# Patient Record
Sex: MALE | Race: WHITE | NOT HISPANIC OR LATINO | Employment: FULL TIME | URBAN - NONMETROPOLITAN AREA
[De-identification: names, ages, dates, MRNs, and addresses within clinical notes are randomized per-mention and may not be internally consistent; named-entity substitution may affect disease eponyms.]

---

## 2024-01-15 PROBLEM — M48.061 SPINAL STENOSIS OF LUMBAR REGION: Chronic | Status: ACTIVE | Noted: 2024-01-15

## 2024-01-15 NOTE — PROGRESS NOTES
"  Subjective        Juanjose Key is a 23 y.o. male who presents for      HPI:          Chief Complaint   Patient presents with    Back Pain     Lumbar stenosis.  Dx approx 1 year ago    Immunizations     Received influenza vaccination this season       Pt is a NEW PATIENT to the office today      What concern/ problem/pain/symptom  brings you here today?  Spinal stenosis      When was pt diagnosis with lumbar stenosis?  Approx 1 year was the formal dx but he feels it's been an issue with him for years.  Played sports in school.      Pain lower back      Pt went to the ED- 1/2-23 - in [facility]         Did he have any injuries to his back?  None    Were any injuries at work?  No          Who is Ortho spine specialist?  Not yet.      Does pt also see Pain medicine clinic?  No      Does pt attend PT?  Yes, 01/2023      Better with flexion , worse with extension, will hurt at night    Pain will wake pt at night- rarely- and very mild and can fall back asleep     Pain 2/10 most of the time       Has pt had any medical or surgical procedures on his back ?  None      BP- 148/85      Works 3rd shift- had caffeine                  Social History     Tobacco Use   Smoking Status Never   Smokeless Tobacco Never         Review of Systems:        Objective        Vitals:    01/17/24 0923   BP: 148/85   BP Location: Left arm   Patient Position: Sitting   BP Cuff Size: Large adult   Pulse: 71   Temp: 37.2 °C (99 °F)   TempSrc: Temporal   SpO2: 98%   Weight: 109 kg (240 lb 4.8 oz)   Height: 1.88 m (6' 2\")             Patient is alert and oriented x 3 , NAD  HEAD- normocephalic and atraumatic   EYES-conjunctiva-normal, lids - normal  EARS/NOSE- normal external exam   NECK-supple,FROM  CV- RRR without murmur  PULM- CTA bilaterally, normal respiratory effort  RESPIRATORY EFFORT- normal , no retractions or nasal flaring   ABD- normoactive BS's   EXT- no edema,NT  SKIN- no abnormal skin lesions noted  NEURO- no focal " deficits  PSYCH- pleasant, normal judgement and insight  BACK-   lumbar vertebrae-NT throughout  extension- painful with extreme movement  flexion- minimal discomfort  DTR's BLE-normal 2+ and symmetrical  straight leg raise- negative  lumbar paraspinal muscles-  tight spasm on the            BP Readings from Last 3 Encounters:   01/17/24 148/85           Wt Readings from Last 3 Encounters:   01/17/24 109 kg (240 lb 4.8 oz)         BMI Readings from Last 3 Encounters:   01/17/24 30.85 kg/m²         Assessment/Plan      1. Spinal stenosis of lumbar region, unspecified whether neurogenic claudication present  Referral to Medical Spine      2. Elevated BP without diagnosis of hypertension  Follow Up In [facility] - PCP - Established          Orders Placed This Encounter   Procedures    Referral to Medical Spine     Refer Ortho Spine         Follow up 4-6 weeks - recheck BP - no current Medication     Call if no better or if symptoms worsen

## 2024-01-17 ENCOUNTER — OFFICE VISIT (OUTPATIENT)
Dept: PRIMARY CARE | Facility: CLINIC | Age: 24
End: 2024-01-17
Payer: COMMERCIAL

## 2024-01-17 VITALS
TEMPERATURE: 99 F | OXYGEN SATURATION: 98 % | WEIGHT: 240.3 LBS | BODY MASS INDEX: 30.84 KG/M2 | SYSTOLIC BLOOD PRESSURE: 148 MMHG | HEIGHT: 74 IN | HEART RATE: 71 BPM | DIASTOLIC BLOOD PRESSURE: 85 MMHG

## 2024-01-17 DIAGNOSIS — M48.061 SPINAL STENOSIS OF LUMBAR REGION, UNSPECIFIED WHETHER NEUROGENIC CLAUDICATION PRESENT: Primary | Chronic | ICD-10-CM

## 2024-01-17 DIAGNOSIS — R03.0 ELEVATED BP WITHOUT DIAGNOSIS OF HYPERTENSION: ICD-10-CM

## 2024-01-17 PROCEDURE — 1036F TOBACCO NON-USER: CPT | Performed by: FAMILY MEDICINE

## 2024-01-17 PROCEDURE — 99203 OFFICE O/P NEW LOW 30 MIN: CPT | Performed by: FAMILY MEDICINE

## 2024-01-17 RX ORDER — CYCLOBENZAPRINE HCL 10 MG
10 TABLET ORAL 3 TIMES DAILY PRN
COMMUNITY
End: 2024-03-18 | Stop reason: WASHOUT

## 2024-01-31 ENCOUNTER — APPOINTMENT (OUTPATIENT)
Dept: ORTHOPEDIC SURGERY | Facility: CLINIC | Age: 24
End: 2024-01-31
Payer: COMMERCIAL

## 2024-02-12 NOTE — PROGRESS NOTES
Subjective        Juanjose Key is a 23 y.o. male who presents for      HPI:          Chief Complaint   Patient presents with    Back Pain     Has dx of spinal stenosis and radiculopathy [sic]    Hypertension           Pt is here today to recheck BP    BP was 148/85 1/17/24        Today  /92           Has pt seen the  Ortho spine specialist? 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-        Discussed salt and caffeine             Works out at gym 5 days per week             Social History     Tobacco Use   Smoking Status Never   Smokeless Tobacco Never         Review of Systems:        Objective        Vitals:    02/20/24 0848   BP: (!) 141/92   BP Location: Left arm   Patient Position: Sitting   BP Cuff Size: Large adult   Pulse: 70   Temp: 37 °C (98.6 °F)   TempSrc: Temporal   SpO2: 97%   Weight: 106 kg (234 lb 3.2 oz)               Patient is alert and oriented x 3 , NAD  HEAD- normocephalic and atraumatic   EYES-conjunctiva-normal, lids - normal  EARS/NOSE- normal external exam   NECK-supple,FROM, thyroid - normal   CV- RRR without murmur  PULM- CTA bilaterally, normal respiratory effort  RESPIRATORY EFFORT- normal , no retractions or nasal flaring   ABD- normoactive BS's   EXT- no edema,NT  SKIN- no abnormal skin lesions noted  NEURO- no focal deficits  PSYCH- pleasant, normal judgement and insight      BP Readings from Last 3 Encounters:   02/20/24 (!) 141/92   01/17/24 148/85           Wt Readings from Last 3 Encounters:   02/20/24 106 kg (234 lb 3.2 oz)   01/17/24 109 kg (240 lb 4.8 oz)         BMI Readings from Last 3 Encounters:   02/20/24 30.07 kg/m²   01/17/24 30.85 kg/m²         Assessment/Plan      1. Elevated BP without diagnosis of hypertension  Follow Up In [facility] - PCP - Established      2. Chronic back pain, unspecified back location, unspecified back pain laterality            Ordered labs        Refer Ortho - spine      History vit d deficiency          Taking vit d OTC- 5000 IU daily- most days         Avoid caffeine         Avoid salt         DASH diet information         Increase activity              No orders of the defined types were placed in this encounter.            Follow up in 4-6 weeks - recheck BP

## 2024-02-16 PROBLEM — M54.50 LOW BACK PAIN, UNSPECIFIED: Status: ACTIVE | Noted: 2023-02-11

## 2024-02-16 PROBLEM — M53.3 DISORDER OF SACROILIAC JOINT: Status: ACTIVE | Noted: 2023-03-09

## 2024-02-16 PROBLEM — G89.29 CHRONIC PAIN: Status: ACTIVE | Noted: 2023-03-09

## 2024-02-20 ENCOUNTER — LAB (OUTPATIENT)
Dept: LAB | Facility: LAB | Age: 24
End: 2024-02-20
Payer: COMMERCIAL

## 2024-02-20 ENCOUNTER — OFFICE VISIT (OUTPATIENT)
Dept: PRIMARY CARE | Facility: CLINIC | Age: 24
End: 2024-02-20
Payer: COMMERCIAL

## 2024-02-20 VITALS
SYSTOLIC BLOOD PRESSURE: 141 MMHG | BODY MASS INDEX: 30.07 KG/M2 | WEIGHT: 234.2 LBS | DIASTOLIC BLOOD PRESSURE: 92 MMHG | TEMPERATURE: 98.6 F | HEART RATE: 70 BPM | OXYGEN SATURATION: 97 %

## 2024-02-20 DIAGNOSIS — R03.0 ELEVATED BP WITHOUT DIAGNOSIS OF HYPERTENSION: ICD-10-CM

## 2024-02-20 DIAGNOSIS — M54.9 CHRONIC BACK PAIN, UNSPECIFIED BACK LOCATION, UNSPECIFIED BACK PAIN LATERALITY: ICD-10-CM

## 2024-02-20 DIAGNOSIS — G89.29 CHRONIC BACK PAIN, UNSPECIFIED BACK LOCATION, UNSPECIFIED BACK PAIN LATERALITY: ICD-10-CM

## 2024-02-20 DIAGNOSIS — E55.9 VITAMIN D DEFICIENCY: ICD-10-CM

## 2024-02-20 DIAGNOSIS — R03.0 ELEVATED BP WITHOUT DIAGNOSIS OF HYPERTENSION: Primary | ICD-10-CM

## 2024-02-20 PROCEDURE — 82248 BILIRUBIN DIRECT: CPT

## 2024-02-20 PROCEDURE — 85027 COMPLETE CBC AUTOMATED: CPT

## 2024-02-20 PROCEDURE — 36415 COLL VENOUS BLD VENIPUNCTURE: CPT

## 2024-02-20 PROCEDURE — 82306 VITAMIN D 25 HYDROXY: CPT

## 2024-02-20 PROCEDURE — 80053 COMPREHEN METABOLIC PANEL: CPT

## 2024-02-20 PROCEDURE — 1036F TOBACCO NON-USER: CPT | Performed by: FAMILY MEDICINE

## 2024-02-20 PROCEDURE — 84443 ASSAY THYROID STIM HORMONE: CPT

## 2024-02-20 PROCEDURE — 99213 OFFICE O/P EST LOW 20 MIN: CPT | Performed by: FAMILY MEDICINE

## 2024-02-21 PROBLEM — R79.89 ABNORMAL TSH: Chronic | Status: ACTIVE | Noted: 2024-02-21

## 2024-02-21 PROBLEM — M48.061 FORAMINAL STENOSIS OF LUMBAR REGION: Status: ACTIVE | Noted: 2023-02-11

## 2024-02-21 PROBLEM — R79.89 ABNORMAL TSH: Status: ACTIVE | Noted: 2024-02-21

## 2024-02-21 PROBLEM — E55.9 VITAMIN D DEFICIENCY: Status: ACTIVE | Noted: 2024-02-21

## 2024-02-21 PROBLEM — R03.0 ELEVATED BLOOD PRESSURE READING WITHOUT DIAGNOSIS OF HYPERTENSION: Status: ACTIVE | Noted: 2024-02-21

## 2024-02-21 LAB
25(OH)D3 SERPL-MCNC: 31 NG/ML (ref 30–100)
ALBUMIN SERPL BCP-MCNC: 5 G/DL (ref 3.4–5)
ALP SERPL-CCNC: 64 U/L (ref 33–120)
ALT SERPL W P-5'-P-CCNC: 28 U/L (ref 10–52)
ANION GAP SERPL CALC-SCNC: 17 MMOL/L (ref 10–20)
AST SERPL W P-5'-P-CCNC: 20 U/L (ref 9–39)
BILIRUB DIRECT SERPL-MCNC: 0.1 MG/DL (ref 0–0.3)
BILIRUB SERPL-MCNC: 0.5 MG/DL (ref 0–1.2)
BUN SERPL-MCNC: 15 MG/DL (ref 6–23)
CALCIUM SERPL-MCNC: 10 MG/DL (ref 8.6–10.6)
CHLORIDE SERPL-SCNC: 103 MMOL/L (ref 98–107)
CO2 SERPL-SCNC: 25 MMOL/L (ref 21–32)
CREAT SERPL-MCNC: 0.89 MG/DL (ref 0.5–1.3)
EGFRCR SERPLBLD CKD-EPI 2021: >90 ML/MIN/1.73M*2
ERYTHROCYTE [DISTWIDTH] IN BLOOD BY AUTOMATED COUNT: 12.1 % (ref 11.5–14.5)
GLUCOSE SERPL-MCNC: 76 MG/DL (ref 74–99)
HCT VFR BLD AUTO: 45.6 % (ref 41–52)
HGB BLD-MCNC: 15.2 G/DL (ref 13.5–17.5)
MCH RBC QN AUTO: 28.7 PG (ref 26–34)
MCHC RBC AUTO-ENTMCNC: 33.3 G/DL (ref 32–36)
MCV RBC AUTO: 86 FL (ref 80–100)
NRBC BLD-RTO: 0 /100 WBCS (ref 0–0)
PLATELET # BLD AUTO: 368 X10*3/UL (ref 150–450)
POTASSIUM SERPL-SCNC: 4.5 MMOL/L (ref 3.5–5.3)
PROT SERPL-MCNC: 7.3 G/DL (ref 6.4–8.2)
RBC # BLD AUTO: 5.3 X10*6/UL (ref 4.5–5.9)
SODIUM SERPL-SCNC: 140 MMOL/L (ref 136–145)
TSH SERPL-ACNC: 4.28 MIU/L (ref 0.44–3.98)
WBC # BLD AUTO: 6.8 X10*3/UL (ref 4.4–11.3)

## 2024-03-04 ENCOUNTER — APPOINTMENT (OUTPATIENT)
Dept: ORTHOPEDIC SURGERY | Facility: CLINIC | Age: 24
End: 2024-03-04
Payer: COMMERCIAL

## 2024-03-07 PROBLEM — E66.811 CLASS 1 OBESITY DUE TO EXCESS CALORIES WITH BODY MASS INDEX (BMI) OF 30.0 TO 30.9 IN ADULT: Chronic | Status: ACTIVE | Noted: 2024-03-07

## 2024-03-07 PROBLEM — E66.09 CLASS 1 OBESITY DUE TO EXCESS CALORIES WITH BODY MASS INDEX (BMI) OF 30.0 TO 30.9 IN ADULT: Chronic | Status: ACTIVE | Noted: 2024-03-07

## 2024-03-07 PROBLEM — R03.0 ELEVATED BP WITHOUT DIAGNOSIS OF HYPERTENSION: Chronic | Status: ACTIVE | Noted: 2024-03-07

## 2024-03-07 NOTE — PROGRESS NOTES
Subjective        Juanjose Key. Is 23 y.o.. male. Here for follow up office visit-       No chief complaint on file.      HPI:        Follow up for recheck BP , insomnia, elevated BMI    BP was 141/92, 148/85      Pt is doing well      Pt had labs 2/20/24      TSH mildly abnormal- plan to recheck today   Plan recheck vit d in 6 months       Lab on 02/20/2024   Component Date Value Ref Range Status    Glucose 02/20/2024 76  74 - 99 mg/dL Final    Sodium 02/20/2024 140  136 - 145 mmol/L Final    Potassium 02/20/2024 4.5  3.5 - 5.3 mmol/L Final    Chloride 02/20/2024 103  98 - 107 mmol/L Final    Bicarbonate 02/20/2024 25  21 - 32 mmol/L Final    Anion Gap 02/20/2024 17  10 - 20 mmol/L Final    Urea Nitrogen 02/20/2024 15  6 - 23 mg/dL Final    Creatinine 02/20/2024 0.89  0.50 - 1.30 mg/dL Final    eGFR 02/20/2024 >90  >60 mL/min/1.73m*2 Final    Calculations of estimated GFR are performed using the 2021 CKD-EPI Study Refit equation without the race variable for the IDMS-Traceable creatinine methods.  https://jasn.asnjournals.org/content/early/2021/09/22/ASN.5984514870    Calcium 02/20/2024 10.0  8.6 - 10.6 mg/dL Final    Thyroid Stimulating Hormone 02/20/2024 4.28 (H)  0.44 - 3.98 mIU/L Final    Albumin 02/20/2024 5.0  3.4 - 5.0 g/dL Final    Bilirubin, Total 02/20/2024 0.5  0.0 - 1.2 mg/dL Final    Bilirubin, Direct 02/20/2024 0.1  0.0 - 0.3 mg/dL Final    Alkaline Phosphatase 02/20/2024 64  33 - 120 U/L Final    ALT 02/20/2024 28  10 - 52 U/L Final    Patients treated with Sulfasalazine may generate falsely decreased results for ALT.    AST 02/20/2024 20  9 - 39 U/L Final    Total Protein 02/20/2024 7.3  6.4 - 8.2 g/dL Final    WBC 02/20/2024 6.8  4.4 - 11.3 x10*3/uL Final    nRBC 02/20/2024 0.0  0.0 - 0.0 /100 WBCs Final    RBC 02/20/2024 5.30  4.50 - 5.90 x10*6/uL Final    Hemoglobin 02/20/2024 15.2  13.5 - 17.5 g/dL Final    Hematocrit 02/20/2024 45.6  41.0 - 52.0 % Final    MCV 02/20/2024 86  80 - 100 fL Final     MCH 02/20/2024 28.7  26.0 - 34.0 pg Final    MCHC 02/20/2024 33.3  32.0 - 36.0 g/dL Final    RDW 02/20/2024 12.1  11.5 - 14.5 % Final    Platelets 02/20/2024 368  150 - 450 x10*3/uL Final    Vitamin D, 25-Hydroxy, Total 02/20/2024 31  30 - 100 ng/mL Final         REVIEW OF SYSTEMS:        Objective      There were no vitals filed for this visit.        Patient is alert and oriented x 3 , NAD  HEAD- normocephalic and atraumatic   EYES-conjunctiva-normal, lids - normal  EARS/NOSE- normal external exam   NECK-supple,FROM  CV- RRR without murmur  PULM- CTA bilaterally, normal respiratory effort  RESPIRATORY EFFORT- normal , no retractions or nasal flaring   ABD- normoactive BS's   EXT- no edema,NT  SKIN- no abnormal skin lesions noted  NEURO- no focal deficits  PSYCH- pleasant, normal judgement and insight              PHYSICAL:      Patient is alert and oriented x 3 , NAD  HEAD- normocephalic and atraumatic   EYES-conjunctiva-normal, lids - normal  EARS/NOSE- normal external exam   NECK-supple,FROM  CV- RRR without murmur  PULM- CTA bilaterally, normal respiratory effort  RESPIRATORY EFFORT- normal , no retractions or nasal flaring   ABD- normoactive BS's   EXT- no edema,NT  SKIN- no abnormal skin lesions noted  NEURO- no focal deficits  PSYCH- pleasant, normal judgement and insight      BP Readings from Last 3 Encounters:   02/20/24 (!) 141/92   01/17/24 148/85             Wt Readings from Last 3 Encounters:   02/20/24 106 kg (234 lb 3.2 oz)   01/17/24 109 kg (240 lb 4.8 oz)           BMI Readings from Last 3 Encounters:   02/20/24 30.07 kg/m²   01/17/24 30.85 kg/m²               The number and complexity of problems addressed is considered moderate.  The amount and/or complexity of data reviewed and analyzed is considered moderate. The risk of complications and/or morbidity/mortality of patient is considered moderate. Overall, this patient encounter is considered a moderate risk visit.      Patient's BMI is  elevated.  Plan- diet and exercise- BMI is elevated. Need to increase activity on a daily basis especially walking.  Monitor  total calories per day- decrease carbohydrates and fats. Goal - lose 1-2 pounds per week.    Recommend 150 minutes of moderate-intensity exercise as tolerated per week and 2-3 days of resistance, flexibility, and neuromotor exercises per week.    Normal BMI- 18.5-25    Overweight=  BMI 26-29    Obese= BMI 30-39    Morbidly Obese = BMI >40        Assessment/Plan      Problem List Items Addressed This Visit          Active Problems    Abnormal TSH (Chronic)    Class 1 obesity due to excess calories with body mass index (BMI) of 30.0 to 30.9 in adult (Chronic)    Elevated BP without diagnosis of hypertension - Primary (Chronic)    Vitamin D deficiency       No orders of the defined types were placed in this encounter.                  Ordered lab      Follow up in

## 2024-03-18 ENCOUNTER — HOSPITAL ENCOUNTER (OUTPATIENT)
Dept: RADIOLOGY | Facility: CLINIC | Age: 24
Discharge: HOME | End: 2024-03-18
Payer: COMMERCIAL

## 2024-03-18 ENCOUNTER — OFFICE VISIT (OUTPATIENT)
Dept: ORTHOPEDIC SURGERY | Facility: CLINIC | Age: 24
End: 2024-03-18
Payer: COMMERCIAL

## 2024-03-18 ENCOUNTER — APPOINTMENT (OUTPATIENT)
Dept: ORTHOPEDIC SURGERY | Facility: CLINIC | Age: 24
End: 2024-03-18
Payer: COMMERCIAL

## 2024-03-18 DIAGNOSIS — G89.29 CHRONIC BACK PAIN, UNSPECIFIED BACK LOCATION, UNSPECIFIED BACK PAIN LATERALITY: ICD-10-CM

## 2024-03-18 DIAGNOSIS — M54.9 CHRONIC BACK PAIN, UNSPECIFIED BACK LOCATION, UNSPECIFIED BACK PAIN LATERALITY: ICD-10-CM

## 2024-03-18 DIAGNOSIS — M51.26 LUMBAR HERNIATED DISC: ICD-10-CM

## 2024-03-18 PROCEDURE — 72110 X-RAY EXAM L-2 SPINE 4/>VWS: CPT | Performed by: STUDENT IN AN ORGANIZED HEALTH CARE EDUCATION/TRAINING PROGRAM

## 2024-03-18 PROCEDURE — 72110 X-RAY EXAM L-2 SPINE 4/>VWS: CPT

## 2024-03-18 PROCEDURE — 99204 OFFICE O/P NEW MOD 45 MIN: CPT | Performed by: PHYSICAL MEDICINE & REHABILITATION

## 2024-03-18 PROCEDURE — 1036F TOBACCO NON-USER: CPT | Performed by: PHYSICAL MEDICINE & REHABILITATION

## 2024-03-18 ASSESSMENT — PAIN DESCRIPTION - DESCRIPTORS: DESCRIPTORS: SHARP;STABBING;SORE

## 2024-03-18 ASSESSMENT — PAIN SCALES - GENERAL
PAINLEVEL: 3
PAINLEVEL_OUTOF10: 3

## 2024-03-18 ASSESSMENT — PAIN - FUNCTIONAL ASSESSMENT: PAIN_FUNCTIONAL_ASSESSMENT: 0-10

## 2024-03-18 NOTE — PROGRESS NOTES
New Consult/New Patient Note    3/18/2024   Referred by Neva Green MD      Assessment:  Juanjose Key is a very pleasant 23 year old male with a history (4 years) of chronic back pain that has worsened over the past 2 years.  Patient reports intermittent back pain without inciting cause.  Patient states that he has gone to the emergency room in the past when he was in college and had imaging that demonstrated spinal stenosis with radiculopathy.  He has gone to physical therapy a year ago (15 sessions), and continues his home exercises 4-5 times a week.  His physical exam was unremarkable with no paraspinal tenderness and full range of motion of his lumbar spine. Pain likely secondary to:   - Discogenic back pain vs Lumbar stenosis     PLAN:  1)  Imaging/Diagnostic Studies: Patient states that he has had lumbar CT and X-ray in the past that demonstrated spinal stenosis. Order placed for lumbar MRI and X-ray. Has completed PT 5/2023 and has continued regular HEP for core strengthening 5x/week  2)  Therapy/Rehabilitation: PT referral given for directional preference based back exercises   3)  Pharmacological Management: None at this time. Recommend that patient use OTC nsaids, 800 mg tid w food for a few days medications if pain occurs. Moic wo relief. Not interested in voltaren.  4)  Spine/Surgical Interventions: None at this time   5)  Alternative Treatments: referral to acupuncture   6)  Consultations:  None at this time  7)  Follow -up: 4-6 weeks or PRN if symptoms worsen/do not improve.   8)  Future treatment considerations: Review Lumbar MRI       The above clinical summary has been dictated with voice recognition software. It has not been proofread for grammatical errors, typographical mistakes, or other semantic inconsistencies.    Thank you for visiting our office today. It was our pleasure to take part in your healthcare.     Do not hesitate to call with any questions regarding your plan of care after  leaving at [phone]    To clinicians, thank you very much for this kind referral. It is a privilege to partner with you in the care of your patients. My office would be delighted to assist you with any further consultations or with questions regarding the plan of care outlined. Do not hesitate to call the office or contact me directly.     Sincerely,    Ananth Kelly DO  Physical Medicine and Rehabilitation PGY-4  [facility]     Supervisory note:  Seen with Dr Kelly, resident.  I saw and evaluated the patient. I personally obtained the key and critical portions of the history and physical exam. I reviewed the resident's documentation and discussed the patient with the resident. I agree with the resident's medical decision making as documented in the resident's note.       Ondina Zeng MD     Division of PM&R      Juanjose Key   is a 23 y.o. male who presents with back pain for the past 2 years.   Patient states that he had a lumbar x-ray in the past that demonstrated lumbar stenosis.   Patient states that he had 4 bulging discs in the past with associated radiculopathy      Location: Lower lumbar  Radiation: Radiates to both legs, typically anterior thigh to bilateral knee, he reports soreness in his proximal legs with this  Quality: Stabbing , tingling, numb current 0/10,  at its worst  8-10/10  Exacerbated by bending over quickly  Relieved by laying down, stretching out, bending forward  Onset, traumatic event: Chronic but worse over the past 2 years  Has tried:  muscle relaxants (flexeril, tizanidine, robaxin)      Valsalva sign is negative  Grocery cart sign is negative  Smoker:  Denies  Does not wake them at night  Litigation: None    Patient denies bowel/bladder incontinence, denies fever, denies unintentional weight loss, denies clumsiness of hands, feet, or dropping things.  Denies any constitutional or myelopathic symptomatology.      PREVIOUS  TREATMENTS  IN THE LAST SIX MONTHS     Active conservative therapy  in the last six months (see below)  1. Physical therapy: 1 year ago- core strengthening/stretching in [facility] (15 sessions), completed April 2023  2. Home exercise program after PT: Yes, 4-5 days a week   3. A physician supervised home exercise program (HEP): No  4. Chiropractic Care: None                                                                    Passive conservative therapy  in the last six months (see below)  1. NSAIDS:   2. Prescription pain medication: Meloxicam (1.5 years ago), muscle relaxants    3. Acupuncture: None  4. Tens unit: Yes- no improvement     Assistive Devices: None    Work status:  at [employer]      ROS: Other than listed in HPI, PMHX below, and intake paperwork including a 30 point patient-recorded review of symptoms which was personally reviewed and inclusive of no history of unintentional weight loss, change in appetite, significant malaise, fevers, chills, or change in bowel/bladder, shortness of breath, or chest pain.    I have confirmed and edited as necessary Past Medical, Past Surgical, Family, Social History and ROS as obtained by others. These were also obtained on new patient forms.      PHYSICAL EXAM:   GENERAL APPEARANCE:  Well nourished, well developed, and no apparent distress.  NEURO PSYCH: Patient oriented to person, place, Mood pleasant. Benign affect.  MUSCULOSKELETAL and NEUROLOGICAL       VISUAL INSPECTION          CERVICAL: WNL          THORACIC: WNL           LUMBAR: WNL  SPINE ROM:   LUMBAR ROM: Normal      PALPATION:           SPINOUS PROCESS: Non-tender to palpation           PARASPINALS: Non-tender to palpation  FACET LOADING: Negative  MUSCLE BULK: Normal and symmetrical in the upper & lower extremities.  MUSCLE TONE: Normal  MOTOR: 5/5 in all muscle groups tested in bilateral upper and lower extremities   SENSORY: Normal sensory exam to light touch  GAIT: Normal.  Able to go up  and heels and toes with no sig. weakness.  No sig. balance deficit appreciated  REFLEXES: +2 to bilateral U/L extremities  LONG TRACT SIGNS: No clonus, Neg Hoffmans.  PERIPHERAL JOINT ROM:   HIP ROM: Full bilaterally  SARAVANAN/Thigh Thrust/Compression/Mike Finger:  Negative bilaterally   Hip Exam including thigh thrust and LOG ROLL: Negative bilaterally    DATA REVIEW:   The below imaging studies were personally reviewed and discussed with the patient.    Medical Decision Making:  The above note constitutes a Moderate to High level of medical decision making based on past data and imaging review, new and chronic symptoms with exacerbation, change in weakness or sensation, new imaging and diagnostic studies ordered, discussion of potential interventional or surgical treatment options, acute or chronic pain that may pose a threat to bodily function.    Past Medical History:   Diagnosis Date    Other infective otitis externa, unspecified ear 11/19/2015    Infective otitis externa    Personal history of other diseases of the nervous system and sense organs 05/29/2015    History of acute otitis media       Medication Documentation Review Audit       Reviewed by Kayli Vu LPN (Licensed Nurse) on 03/18/24 at 1319      Medication Order Taking? Sig Documenting Provider Last Dose Status   cyclobenzaprine (Flexeril) 10 mg tablet [id number] No Take 1 tablet (10 mg) by mouth 3 times a day as needed for muscle spasms. 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   Tobacco Use    Smoking status: Never    Smokeless tobacco: Never   Substance and Sexual Activity    Alcohol  use: Yes     Comment: 2-3 weekly    Drug use: Never    Sexual activity: Not on file   Other Topics Concern    Not on file   Social History Narrative    Not on file     Social Determinants of Health     Financial Resource Strain: Not on file   Food Insecurity: Not on file   Transportation Needs: Not on file   Physical Activity: Not on file   Stress: Not on file   Social Connections: Not on file   Intimate Partner Violence: Not on file   Housing Stability: Not on file       No past surgical history on file.

## 2024-03-20 ENCOUNTER — APPOINTMENT (OUTPATIENT)
Dept: PRIMARY CARE | Facility: CLINIC | Age: 24
End: 2024-03-20
Payer: COMMERCIAL

## 2024-04-01 NOTE — PROGRESS NOTES
Subjective        Juanjose Key. Is 23 y.o.. male. Here for follow up office visit-       No chief complaint on file.      HPI:        Follow up for recheck BP , insomnia, elevated BMI    BP was 141/92, 148/85      Pt is doing well      Pt had labs 2/20/24      TSH mildly abnormal- plan to recheck today   Plan recheck vit d in 6 months       Lab on 02/20/2024   Component Date Value Ref Range Status    Glucose 02/20/2024 76  74 - 99 mg/dL Final    Sodium 02/20/2024 140  136 - 145 mmol/L Final    Potassium 02/20/2024 4.5  3.5 - 5.3 mmol/L Final    Chloride 02/20/2024 103  98 - 107 mmol/L Final    Bicarbonate 02/20/2024 25  21 - 32 mmol/L Final    Anion Gap 02/20/2024 17  10 - 20 mmol/L Final    Urea Nitrogen 02/20/2024 15  6 - 23 mg/dL Final    Creatinine 02/20/2024 0.89  0.50 - 1.30 mg/dL Final    eGFR 02/20/2024 >90  >60 mL/min/1.73m*2 Final    Calculations of estimated GFR are performed using the 2021 CKD-EPI Study Refit equation without the race variable for the IDMS-Traceable creatinine methods.  https://jasn.asnjournals.org/content/early/2021/09/22/ASN.8397599511    Calcium 02/20/2024 10.0  8.6 - 10.6 mg/dL Final    Thyroid Stimulating Hormone 02/20/2024 4.28 (H)  0.44 - 3.98 mIU/L Final    Albumin 02/20/2024 5.0  3.4 - 5.0 g/dL Final    Bilirubin, Total 02/20/2024 0.5  0.0 - 1.2 mg/dL Final    Bilirubin, Direct 02/20/2024 0.1  0.0 - 0.3 mg/dL Final    Alkaline Phosphatase 02/20/2024 64  33 - 120 U/L Final    ALT 02/20/2024 28  10 - 52 U/L Final    Patients treated with Sulfasalazine may generate falsely decreased results for ALT.    AST 02/20/2024 20  9 - 39 U/L Final    Total Protein 02/20/2024 7.3  6.4 - 8.2 g/dL Final    WBC 02/20/2024 6.8  4.4 - 11.3 x10*3/uL Final    nRBC 02/20/2024 0.0  0.0 - 0.0 /100 WBCs Final    RBC 02/20/2024 5.30  4.50 - 5.90 x10*6/uL Final    Hemoglobin 02/20/2024 15.2  13.5 - 17.5 g/dL Final    Hematocrit 02/20/2024 45.6  41.0 - 52.0 % Final    MCV 02/20/2024 86  80 - 100 fL Final     MCH 02/20/2024 28.7  26.0 - 34.0 pg Final    MCHC 02/20/2024 33.3  32.0 - 36.0 g/dL Final    RDW 02/20/2024 12.1  11.5 - 14.5 % Final    Platelets 02/20/2024 368  150 - 450 x10*3/uL Final    Vitamin D, 25-Hydroxy, Total 02/20/2024 31  30 - 100 ng/mL Final         REVIEW OF SYSTEMS:        Objective      There were no vitals filed for this visit.        Patient is alert and oriented x 3 , NAD  HEAD- normocephalic and atraumatic   EYES-conjunctiva-normal, lids - normal  EARS/NOSE- normal external exam   NECK-supple,FROM  CV- RRR without murmur  PULM- CTA bilaterally, normal respiratory effort  RESPIRATORY EFFORT- normal , no retractions or nasal flaring   ABD- normoactive BS's   EXT- no edema,NT  SKIN- no abnormal skin lesions noted  NEURO- no focal deficits  PSYCH- pleasant, normal judgement and insight              PHYSICAL:      Patient is alert and oriented x 3 , NAD  HEAD- normocephalic and atraumatic   EYES-conjunctiva-normal, lids - normal  EARS/NOSE- normal external exam   NECK-supple,FROM  CV- RRR without murmur  PULM- CTA bilaterally, normal respiratory effort  RESPIRATORY EFFORT- normal , no retractions or nasal flaring   ABD- normoactive BS's   EXT- no edema,NT  SKIN- no abnormal skin lesions noted  NEURO- no focal deficits  PSYCH- pleasant, normal judgement and insight      BP Readings from Last 3 Encounters:   02/20/24 (!) 141/92   01/17/24 148/85             Wt Readings from Last 3 Encounters:   02/20/24 106 kg (234 lb 3.2 oz)   01/17/24 109 kg (240 lb 4.8 oz)           BMI Readings from Last 3 Encounters:   02/20/24 30.07 kg/m²   01/17/24 30.85 kg/m²               The number and complexity of problems addressed is considered moderate.  The amount and/or complexity of data reviewed and analyzed is considered moderate. The risk of complications and/or morbidity/mortality of patient is considered moderate. Overall, this patient encounter is considered a moderate risk visit.      Patient's BMI is  elevated.  Plan- diet and exercise- BMI is elevated. Need to increase activity on a daily basis especially walking.  Monitor  total calories per day- decrease carbohydrates and fats. Goal - lose 1-2 pounds per week.    Recommend 150 minutes of moderate-intensity exercise as tolerated per week and 2-3 days of resistance, flexibility, and neuromotor exercises per week.    Normal BMI- 18.5-25    Overweight=  BMI 26-29    Obese= BMI 30-39    Morbidly Obese = BMI >40        Assessment/Plan      Problem List Items Addressed This Visit    None        No orders of the defined types were placed in this encounter.                  Ordered lab      Follow up in

## 2024-04-03 ENCOUNTER — APPOINTMENT (OUTPATIENT)
Dept: PRIMARY CARE | Facility: CLINIC | Age: 24
End: 2024-04-03
Payer: COMMERCIAL

## 2024-04-08 ENCOUNTER — APPOINTMENT (OUTPATIENT)
Dept: RADIOLOGY | Facility: HOSPITAL | Age: 24
End: 2024-04-08
Payer: COMMERCIAL

## 2024-04-09 ENCOUNTER — APPOINTMENT (OUTPATIENT)
Dept: RADIOLOGY | Facility: HOSPITAL | Age: 24
End: 2024-04-09
Payer: COMMERCIAL

## 2024-04-11 ENCOUNTER — APPOINTMENT (OUTPATIENT)
Dept: INTEGRATIVE MEDICINE | Facility: CLINIC | Age: 24
End: 2024-04-11
Payer: COMMERCIAL

## 2024-04-11 ENCOUNTER — ALLIED HEALTH (OUTPATIENT)
Dept: INTEGRATIVE MEDICINE | Facility: CLINIC | Age: 24
End: 2024-04-11
Payer: COMMERCIAL

## 2024-04-11 DIAGNOSIS — G89.29 CHRONIC BILATERAL LOW BACK PAIN, UNSPECIFIED WHETHER SCIATICA PRESENT: Primary | ICD-10-CM

## 2024-04-11 DIAGNOSIS — M54.50 CHRONIC BILATERAL LOW BACK PAIN, UNSPECIFIED WHETHER SCIATICA PRESENT: Primary | ICD-10-CM

## 2024-04-11 PROCEDURE — 97810 ACUP 1/> WO ESTIM 1ST 15 MIN: CPT

## 2024-04-11 PROCEDURE — 97811 ACUP 1/> W/O ESTIM EA ADD 15: CPT

## 2024-04-11 PROCEDURE — 99202 OFFICE O/P NEW SF 15 MIN: CPT

## 2024-04-11 SDOH — ECONOMIC STABILITY: FOOD INSECURITY: WITHIN THE PAST 12 MONTHS, THE FOOD YOU BOUGHT JUST DIDN'T LAST AND YOU DIDN'T HAVE MONEY TO GET MORE.: NEVER TRUE

## 2024-04-11 SDOH — SOCIAL STABILITY: SOCIAL NETWORK: I HAVE TROUBLE DOING ALL OF THE ACTIVITIES WITH FRIENDS THAT I WANT TO DO: SOMETIMES

## 2024-04-11 SDOH — ECONOMIC STABILITY: FOOD INSECURITY: WITHIN THE PAST 12 MONTHS, YOU WORRIED THAT YOUR FOOD WOULD RUN OUT BEFORE YOU GOT MONEY TO BUY MORE.: NEVER TRUE

## 2024-04-11 SDOH — SOCIAL STABILITY: SOCIAL NETWORK: I HAVE TROUBLE DOING ALL OF MY REGULAR LEISURE ACTIVITIES WITH OTHERS: SOMETIMES

## 2024-04-11 SDOH — SOCIAL STABILITY: SOCIAL NETWORK: I HAVE TROUBLE DOING ALL OF MY USUAL WORK (INCLUDE WORK AT HOME): RARELY

## 2024-04-11 SDOH — SOCIAL STABILITY: SOCIAL NETWORK: PROMIS ABILITY TO PARTICIPATE IN SOCIAL ROLES & ACTIVITIES T-SCORE: 48

## 2024-04-11 SDOH — SOCIAL STABILITY: SOCIAL NETWORK: I HAVE TROUBLE DOING ALL OF THE FAMILY ACTIVITIES THAT I WANT TO DO: RARELY

## 2024-04-11 SDOH — SOCIAL STABILITY: SOCIAL NETWORK

## 2024-04-11 ASSESSMENT — PROMIS GLOBAL HEALTH SCALE
RATE_QUALITY_OF_LIFE: GOOD
EMOTIONAL_PROBLEMS: NEVER
RATE_MENTAL_HEALTH: VERY GOOD
RATE_SOCIAL_SATISFACTION: GOOD
CARRYOUT_SOCIAL_ACTIVITIES: GOOD
CARRYOUT_PHYSICAL_ACTIVITIES: MOSTLY
RATE_AVERAGE_FATIGUE: MILD
RATE_AVERAGE_PAIN: 5
RATE_GENERAL_HEALTH: GOOD
RATE_PHYSICAL_HEALTH: GOOD

## 2024-04-11 ASSESSMENT — ANXIETY QUESTIONNAIRES
PAST MONTH HOW OFTEN HAVE YOU FELT CONFIDENT ABOUT YOUR ABILITY TO HANDLE YOUR PROBLEMS: 4 - VERY OFTEN
PAST MONTH HOW OFTEN HAVE YOU FELT THAT THINGS WERE GOING YOUR WAY: 3 - FAIRLY OFTEN
PAST MONTH HOW OFTEN HAVE YOU FELT DIFFICULTIES WERE PILING UP SO HIGH THAT YOU COULD NOT OVERCOME THEM: 0 - NEVER
PAST MONTH HOW OFTEN HAVE YOU FELT CONFIDENT ABOUT YOUR ABILITY TO HANDLE YOUR PROBLEMS: 4 - VERY OFTEN
PAST MONTH HOW OFTEN HAVE YOU FELT THAT YOU WERE UNABLE TO CONTROL THE IMPORTANT THINGS IN YOUR LIFE: 3 - FAIRLY OFTEN
PAST MONTH HOW OFTEN HAVE YOU FELT THAT YOU WERE UNABLE TO CONTROL THE IMPORTANT THINGS IN YOUR LIFE: 3 - FAIRLY OFTEN

## 2024-04-11 NOTE — PROGRESS NOTES
"Acupuncture Visit:     Subjective   Patient ID: Juanjose Key is a 23 y.o. male who presents for Back Pain     Employee Health  12 [employer]    CC: Chronic Low Back Pain    Initial Visit: 04/11/24  Tx 1/20    Patient is a 23 year old male seeking support for chronic low back pain.  He states he plans on getting an MRI to assess low back more extensively.  He notes back pain has gotten worse with time.      Onset:  4 years ongoing but notes it has been longer than that    Location:  Fixed in the lumbar region mostly but can radiate out into the hips and onto the lower limbs    Duration:  Varied    Frequency:  Daily but notes to varying degrees, sometimes he is unaware of the pain    Characteristics:  Sharp stabbing  Dull achy    Alleviates:  Non weight bearing home     Aggravates:   Hyper extension of back    Related sx:  Not debilitating enough to cause a lot of disruption in daily life but notes he generally tries to anticipate and avoid activities that can cause more pain.    Tx methods:  Pt  Goes to the gym often  Rx muscle relaxers and OTC pain meds    Pain severity:  Worse - 10/10 but rare otherwise worse is 7/10  Normal - 3-4/10  Today - 3-4/10    Feeling (emotions or fatigue):  Tired    Other relevant:  Dx hip dysplasia at birth    Goal for acupuncture treatment:  \"Trying all my options to see if it feels better\"                            Session Information  Is this acupuncture treatment being billed to the patient's insurance company: Yes  This is visit number: 1  The patient has a total number of visits of: 20  Initial Acupuncture Treatment date: 04/11/24  Name of Insurance Company:  Employee  Visit Type: New patient  Medical History Reviewed: I have reviewed pertinent medical history in EHR, and no contraindications are present to provide treatment         Review of Systems         Provider reviewed plan for the acupuncture session, precautions and contraindications. Patient/guardian/hospital staff has " given consent to treat with full understanding of what to expect during the session. Before acupuncture began, provider explained to the patient to communicate at any time if the procedure was causing discomfort past their tolerance level. Patient agreed to advise acupuncturist. The acupuncturist counseled the patient on the risks of acupuncture treatment including pain, infection, bleeding, and no relief of pain. The patient was positioned comfortably. There was no evidence of infection at the site of needle insertions.    Objective   Physical Exam         Treatment Plan  Treatment Goals: Pain management, Wellbeing improvement    Acupuncture Treatment  Patient Position: Prone on a table  Acupuncture Needling: Yes  Needle Guage: 32 guage /.25/ Purple seirin  Body Points: With retention  Body Points - Bilateral: BL23,52,62; KD3,7; HTJJ L3-S1  Auricular Points: No  Electroacupuncture Used: No  Other Techniques Utilized: TDP Lamp, Topicals  Topicals Description: Carlos Lopez  TDP Lamp Descripton: 20mins on low back  Needle Count In: 18  Needle Count Out: 18  Needle Retention Time (min): 30 minutes  Total Face to Face Time (min): 30 minutes              Assessment/Plan   Diagnoses and all orders for this visit:  Chronic bilateral low back pain, unspecified whether sciatica present

## 2024-04-15 NOTE — PROGRESS NOTES
Subjective        Juanjose Key. Is 23 y.o.. male. Here for follow up office visit-       No chief complaint on file.      HPI:        Follow up for recheck BP , insomnia, elevated BMI    BP was 141/92, 148/85      Pt is doing well      Pt had labs 2/20/24      TSH mildly abnormal- plan to recheck today   Plan recheck vit d in 6 months       Lab on 02/20/2024   Component Date Value Ref Range Status    Glucose 02/20/2024 76  74 - 99 mg/dL Final    Sodium 02/20/2024 140  136 - 145 mmol/L Final    Potassium 02/20/2024 4.5  3.5 - 5.3 mmol/L Final    Chloride 02/20/2024 103  98 - 107 mmol/L Final    Bicarbonate 02/20/2024 25  21 - 32 mmol/L Final    Anion Gap 02/20/2024 17  10 - 20 mmol/L Final    Urea Nitrogen 02/20/2024 15  6 - 23 mg/dL Final    Creatinine 02/20/2024 0.89  0.50 - 1.30 mg/dL Final    eGFR 02/20/2024 >90  >60 mL/min/1.73m*2 Final    Calculations of estimated GFR are performed using the 2021 CKD-EPI Study Refit equation without the race variable for the IDMS-Traceable creatinine methods.  https://jasn.asnjournals.org/content/early/2021/09/22/ASN.6652645961    Calcium 02/20/2024 10.0  8.6 - 10.6 mg/dL Final    Thyroid Stimulating Hormone 02/20/2024 4.28 (H)  0.44 - 3.98 mIU/L Final    Albumin 02/20/2024 5.0  3.4 - 5.0 g/dL Final    Bilirubin, Total 02/20/2024 0.5  0.0 - 1.2 mg/dL Final    Bilirubin, Direct 02/20/2024 0.1  0.0 - 0.3 mg/dL Final    Alkaline Phosphatase 02/20/2024 64  33 - 120 U/L Final    ALT 02/20/2024 28  10 - 52 U/L Final    Patients treated with Sulfasalazine may generate falsely decreased results for ALT.    AST 02/20/2024 20  9 - 39 U/L Final    Total Protein 02/20/2024 7.3  6.4 - 8.2 g/dL Final    WBC 02/20/2024 6.8  4.4 - 11.3 x10*3/uL Final    nRBC 02/20/2024 0.0  0.0 - 0.0 /100 WBCs Final    RBC 02/20/2024 5.30  4.50 - 5.90 x10*6/uL Final    Hemoglobin 02/20/2024 15.2  13.5 - 17.5 g/dL Final    Hematocrit 02/20/2024 45.6  41.0 - 52.0 % Final    MCV 02/20/2024 86  80 - 100 fL Final     MCH 02/20/2024 28.7  26.0 - 34.0 pg Final    MCHC 02/20/2024 33.3  32.0 - 36.0 g/dL Final    RDW 02/20/2024 12.1  11.5 - 14.5 % Final    Platelets 02/20/2024 368  150 - 450 x10*3/uL Final    Vitamin D, 25-Hydroxy, Total 02/20/2024 31  30 - 100 ng/mL Final         REVIEW OF SYSTEMS:        Objective      There were no vitals filed for this visit.        Patient is alert and oriented x 3 , NAD  HEAD- normocephalic and atraumatic   EYES-conjunctiva-normal, lids - normal  EARS/NOSE- normal external exam   NECK-supple,FROM  CV- RRR without murmur  PULM- CTA bilaterally, normal respiratory effort  RESPIRATORY EFFORT- normal , no retractions or nasal flaring   ABD- normoactive BS's   EXT- no edema,NT  SKIN- no abnormal skin lesions noted  NEURO- no focal deficits  PSYCH- pleasant, normal judgement and insight              PHYSICAL:      Patient is alert and oriented x 3 , NAD  HEAD- normocephalic and atraumatic   EYES-conjunctiva-normal, lids - normal  EARS/NOSE- normal external exam   NECK-supple,FROM  CV- RRR without murmur  PULM- CTA bilaterally, normal respiratory effort  RESPIRATORY EFFORT- normal , no retractions or nasal flaring   ABD- normoactive BS's   EXT- no edema,NT  SKIN- no abnormal skin lesions noted  NEURO- no focal deficits  PSYCH- pleasant, normal judgement and insight      BP Readings from Last 3 Encounters:   02/20/24 (!) 141/92   01/17/24 148/85             Wt Readings from Last 3 Encounters:   02/20/24 106 kg (234 lb 3.2 oz)   01/17/24 109 kg (240 lb 4.8 oz)           BMI Readings from Last 3 Encounters:   02/20/24 30.07 kg/m²   01/17/24 30.85 kg/m²               The number and complexity of problems addressed is considered moderate.  The amount and/or complexity of data reviewed and analyzed is considered moderate. The risk of complications and/or morbidity/mortality of patient is considered moderate. Overall, this patient encounter is considered a moderate risk visit.      Patient's BMI is  elevated.  Plan- diet and exercise- BMI is elevated. Need to increase activity on a daily basis especially walking.  Monitor  total calories per day- decrease carbohydrates and fats. Goal - lose 1-2 pounds per week.    Recommend 150 minutes of moderate-intensity exercise as tolerated per week and 2-3 days of resistance, flexibility, and neuromotor exercises per week.    Normal BMI- 18.5-25    Overweight=  BMI 26-29    Obese= BMI 30-39    Morbidly Obese = BMI >40        Assessment/Plan      Problem List Items Addressed This Visit    None        No orders of the defined types were placed in this encounter.                  Ordered lab      Follow up in

## 2024-04-17 ENCOUNTER — APPOINTMENT (OUTPATIENT)
Dept: PRIMARY CARE | Facility: CLINIC | Age: 24
End: 2024-04-17
Payer: COMMERCIAL

## 2024-04-17 NOTE — PROGRESS NOTES
Subjective        Juanjose Key. Is 23 y.o.. male. Here for follow up office visit-       No chief complaint on file.      HPI:        Follow up for recheck BP , insomnia, elevated BMI    BP was 141/92, 148/85      Pt is doing well      Pt had labs 2/20/24      TSH mildly abnormal- plan to recheck today   Plan recheck vit d in 6 months       Lab on 02/20/2024   Component Date Value Ref Range Status    Glucose 02/20/2024 76  74 - 99 mg/dL Final    Sodium 02/20/2024 140  136 - 145 mmol/L Final    Potassium 02/20/2024 4.5  3.5 - 5.3 mmol/L Final    Chloride 02/20/2024 103  98 - 107 mmol/L Final    Bicarbonate 02/20/2024 25  21 - 32 mmol/L Final    Anion Gap 02/20/2024 17  10 - 20 mmol/L Final    Urea Nitrogen 02/20/2024 15  6 - 23 mg/dL Final    Creatinine 02/20/2024 0.89  0.50 - 1.30 mg/dL Final    eGFR 02/20/2024 >90  >60 mL/min/1.73m*2 Final    Calculations of estimated GFR are performed using the 2021 CKD-EPI Study Refit equation without the race variable for the IDMS-Traceable creatinine methods.  https://jasn.asnjournals.org/content/early/2021/09/22/ASN.5911873631    Calcium 02/20/2024 10.0  8.6 - 10.6 mg/dL Final    Thyroid Stimulating Hormone 02/20/2024 4.28 (H)  0.44 - 3.98 mIU/L Final    Albumin 02/20/2024 5.0  3.4 - 5.0 g/dL Final    Bilirubin, Total 02/20/2024 0.5  0.0 - 1.2 mg/dL Final    Bilirubin, Direct 02/20/2024 0.1  0.0 - 0.3 mg/dL Final    Alkaline Phosphatase 02/20/2024 64  33 - 120 U/L Final    ALT 02/20/2024 28  10 - 52 U/L Final    Patients treated with Sulfasalazine may generate falsely decreased results for ALT.    AST 02/20/2024 20  9 - 39 U/L Final    Total Protein 02/20/2024 7.3  6.4 - 8.2 g/dL Final    WBC 02/20/2024 6.8  4.4 - 11.3 x10*3/uL Final    nRBC 02/20/2024 0.0  0.0 - 0.0 /100 WBCs Final    RBC 02/20/2024 5.30  4.50 - 5.90 x10*6/uL Final    Hemoglobin 02/20/2024 15.2  13.5 - 17.5 g/dL Final    Hematocrit 02/20/2024 45.6  41.0 - 52.0 % Final    MCV 02/20/2024 86  80 - 100 fL Final     MCH 02/20/2024 28.7  26.0 - 34.0 pg Final    MCHC 02/20/2024 33.3  32.0 - 36.0 g/dL Final    RDW 02/20/2024 12.1  11.5 - 14.5 % Final    Platelets 02/20/2024 368  150 - 450 x10*3/uL Final    Vitamin D, 25-Hydroxy, Total 02/20/2024 31  30 - 100 ng/mL Final         REVIEW OF SYSTEMS:        Objective      There were no vitals filed for this visit.        Patient is alert and oriented x 3 , NAD  HEAD- normocephalic and atraumatic   EYES-conjunctiva-normal, lids - normal  EARS/NOSE- normal external exam   NECK-supple,FROM  CV- RRR without murmur  PULM- CTA bilaterally, normal respiratory effort  RESPIRATORY EFFORT- normal , no retractions or nasal flaring   ABD- normoactive BS's   EXT- no edema,NT  SKIN- no abnormal skin lesions noted  NEURO- no focal deficits  PSYCH- pleasant, normal judgement and insight              PHYSICAL:      Patient is alert and oriented x 3 , NAD  HEAD- normocephalic and atraumatic   EYES-conjunctiva-normal, lids - normal  EARS/NOSE- normal external exam   NECK-supple,FROM  CV- RRR without murmur  PULM- CTA bilaterally, normal respiratory effort  RESPIRATORY EFFORT- normal , no retractions or nasal flaring   ABD- normoactive BS's   EXT- no edema,NT  SKIN- no abnormal skin lesions noted  NEURO- no focal deficits  PSYCH- pleasant, normal judgement and insight      BP Readings from Last 3 Encounters:   02/20/24 (!) 141/92   01/17/24 148/85             Wt Readings from Last 3 Encounters:   02/20/24 106 kg (234 lb 3.2 oz)   01/17/24 109 kg (240 lb 4.8 oz)           BMI Readings from Last 3 Encounters:   02/20/24 30.07 kg/m²   01/17/24 30.85 kg/m²               The number and complexity of problems addressed is considered moderate.  The amount and/or complexity of data reviewed and analyzed is considered moderate. The risk of complications and/or morbidity/mortality of patient is considered moderate. Overall, this patient encounter is considered a moderate risk visit.      Patient's BMI is  elevated.  Plan- diet and exercise- BMI is elevated. Need to increase activity on a daily basis especially walking.  Monitor  total calories per day- decrease carbohydrates and fats. Goal - lose 1-2 pounds per week.    Recommend 150 minutes of moderate-intensity exercise as tolerated per week and 2-3 days of resistance, flexibility, and neuromotor exercises per week.    Normal BMI- 18.5-25    Overweight=  BMI 26-29    Obese= BMI 30-39    Morbidly Obese = BMI >40        Assessment/Plan      Problem List Items Addressed This Visit    None        No orders of the defined types were placed in this encounter.                  Ordered lab      Follow up in

## 2024-04-18 ENCOUNTER — APPOINTMENT (OUTPATIENT)
Dept: INTEGRATIVE MEDICINE | Facility: CLINIC | Age: 24
End: 2024-04-18
Payer: COMMERCIAL

## 2024-04-23 ENCOUNTER — APPOINTMENT (OUTPATIENT)
Dept: RADIOLOGY | Facility: HOSPITAL | Age: 24
End: 2024-04-23
Payer: COMMERCIAL

## 2024-04-29 ENCOUNTER — APPOINTMENT (OUTPATIENT)
Dept: PRIMARY CARE | Facility: CLINIC | Age: 24
End: 2024-04-29
Payer: COMMERCIAL

## 2024-05-03 ENCOUNTER — ALLIED HEALTH (OUTPATIENT)
Dept: INTEGRATIVE MEDICINE | Facility: CLINIC | Age: 24
End: 2024-05-03
Payer: COMMERCIAL

## 2024-05-03 DIAGNOSIS — G89.29 CHRONIC BILATERAL LOW BACK PAIN, UNSPECIFIED WHETHER SCIATICA PRESENT: Primary | ICD-10-CM

## 2024-05-03 DIAGNOSIS — M54.50 CHRONIC BILATERAL LOW BACK PAIN, UNSPECIFIED WHETHER SCIATICA PRESENT: Primary | ICD-10-CM

## 2024-05-03 PROCEDURE — 97810 ACUP 1/> WO ESTIM 1ST 15 MIN: CPT

## 2024-05-03 PROCEDURE — 97811 ACUP 1/> W/O ESTIM EA ADD 15: CPT

## 2024-05-09 ENCOUNTER — APPOINTMENT (OUTPATIENT)
Dept: RADIOLOGY | Facility: HOSPITAL | Age: 24
End: 2024-05-09
Payer: COMMERCIAL

## 2024-05-14 ENCOUNTER — APPOINTMENT (OUTPATIENT)
Dept: INTEGRATIVE MEDICINE | Facility: CLINIC | Age: 24
End: 2024-05-14
Payer: COMMERCIAL

## 2024-05-17 ENCOUNTER — APPOINTMENT (OUTPATIENT)
Dept: PHYSICAL MEDICINE AND REHAB | Facility: CLINIC | Age: 24
End: 2024-05-17
Payer: COMMERCIAL

## 2024-05-20 ENCOUNTER — APPOINTMENT (OUTPATIENT)
Dept: PRIMARY CARE | Facility: CLINIC | Age: 24
End: 2024-05-20
Payer: COMMERCIAL

## 2025-01-06 ENCOUNTER — OFFICE VISIT (OUTPATIENT)
Dept: URGENT CARE | Age: 25
End: 2025-01-06
Payer: COMMERCIAL

## 2025-01-06 VITALS
HEIGHT: 75 IN | OXYGEN SATURATION: 98 % | BODY MASS INDEX: 27.98 KG/M2 | TEMPERATURE: 98.7 F | RESPIRATION RATE: 18 BRPM | DIASTOLIC BLOOD PRESSURE: 84 MMHG | HEART RATE: 86 BPM | SYSTOLIC BLOOD PRESSURE: 138 MMHG | WEIGHT: 225 LBS

## 2025-01-06 DIAGNOSIS — S39.012A ACUTE MYOFASCIAL STRAIN OF LUMBOSACRAL REGION, INITIAL ENCOUNTER: Primary | ICD-10-CM

## 2025-01-06 PROCEDURE — 3008F BODY MASS INDEX DOCD: CPT | Performed by: FAMILY MEDICINE

## 2025-01-06 PROCEDURE — 96372 THER/PROPH/DIAG INJ SC/IM: CPT | Performed by: FAMILY MEDICINE

## 2025-01-06 PROCEDURE — 1036F TOBACCO NON-USER: CPT | Performed by: FAMILY MEDICINE

## 2025-01-06 PROCEDURE — 99204 OFFICE O/P NEW MOD 45 MIN: CPT | Performed by: FAMILY MEDICINE

## 2025-01-06 RX ORDER — METHYLPREDNISOLONE SODIUM SUCCINATE 125 MG/2ML
125 INJECTION INTRAMUSCULAR; INTRAVENOUS ONCE
Status: COMPLETED | OUTPATIENT
Start: 2025-01-06 | End: 2025-01-06

## 2025-01-06 RX ORDER — CYCLOBENZAPRINE HCL 10 MG
10 TABLET ORAL 3 TIMES DAILY PRN
Qty: 30 TABLET | Refills: 0 | Status: SHIPPED | OUTPATIENT
Start: 2025-01-06

## 2025-01-06 RX ORDER — PREDNISONE 20 MG/1
20 TABLET ORAL 2 TIMES DAILY
Qty: 10 TABLET | Refills: 0 | Status: SHIPPED | OUTPATIENT
Start: 2025-01-06 | End: 2025-01-11

## 2025-01-06 RX ORDER — KETOROLAC TROMETHAMINE 30 MG/ML
30 INJECTION, SOLUTION INTRAMUSCULAR; INTRAVENOUS ONCE
Status: COMPLETED | OUTPATIENT
Start: 2025-01-06 | End: 2025-01-06

## 2025-01-06 RX ADMIN — KETOROLAC TROMETHAMINE 30 MG: 30 INJECTION, SOLUTION INTRAMUSCULAR; INTRAVENOUS at 09:00

## 2025-01-06 RX ADMIN — METHYLPREDNISOLONE SODIUM SUCCINATE 125 MG: 125 INJECTION INTRAMUSCULAR; INTRAVENOUS at 09:06

## 2025-01-06 RX ADMIN — KETOROLAC TROMETHAMINE 30 MG: 30 INJECTION, SOLUTION INTRAMUSCULAR; INTRAVENOUS at 09:02

## 2025-01-06 ASSESSMENT — PAIN SCALES - GENERAL: PAINLEVEL_OUTOF10: 8

## 2025-01-06 NOTE — PROGRESS NOTES
"Subjective   Patient ID: Juanjose Key is a 24 y.o. male. He presents today with a chief complaint of Back Pain (1 day low back pain / spasms). Patient has a history of spondylolisthesis and spinal stenosis, with recurrent low back pain. Current episode of pain began yesterday, lower back, nonradiating. He tried Ibuprofen, which did not help.    History of Present Illness  HPI    Past Medical History  Allergies as of 01/06/2025    (No Known Allergies)       (Not in a hospital admission)       Past Medical History:   Diagnosis Date    Other infective otitis externa, unspecified ear 11/19/2015    Infective otitis externa    Personal history of other diseases of the nervous system and sense organs 05/29/2015    History of acute otitis media       History reviewed. No pertinent surgical history.     reports that he has never smoked. He has never used smokeless tobacco. He reports current alcohol use. He reports that he does not use drugs.    Review of Systems  Review of Systems                               Objective    Vitals:    01/06/25 0815   BP: 138/84   BP Location: Left arm   Patient Position: Sitting   BP Cuff Size: Adult   Pulse: 86   Resp: 18   Temp: 37.1 °C (98.7 °F)   TempSrc: Oral   SpO2: 98%   Weight: 102 kg (225 lb)   Height: 1.905 m (6' 3\")     No LMP for male patient.    Physical Exam  Constitutional:       General: He is not in acute distress.     Appearance: Normal appearance. He is not toxic-appearing.   Cardiovascular:      Rate and Rhythm: Normal rate.      Pulses: Normal pulses.   Pulmonary:      Effort: Pulmonary effort is normal.   Musculoskeletal:         General: No swelling. Normal range of motion.      Cervical back: Normal range of motion. No rigidity.      Lumbar back: Spasms present. Negative right straight leg raise test and negative left straight leg raise test.   Skin:     General: Skin is warm and dry.   Neurological:      General: No focal deficit present.      Mental Status: He is " alert.      Sensory: No sensory deficit.         Procedures    Point of Care Test & Imaging Results from this visit  No results found for this visit on 01/06/25.   No results found.    Diagnostic study results (if any) were reviewed by Felecia Betts MD.    Assessment/Plan   Allergies, medications, history, and pertinent labs/EKGs/Imaging reviewed by Felecia Betts MD.     Medical Decision Making      Orders and Diagnoses  There are no diagnoses linked to this encounter.    Medical Admin Record      Patient disposition: Home    Electronically signed by Felecia Betts MD  8:21 AM

## 2025-01-06 NOTE — PATIENT INSTRUCTIONS
Prednisone as directed; take with food. Start tomorrow.  Cyclobenzaprine as directed; may cause drowsiness.  Follow up with new or worsening symptoms.

## 2025-01-07 ENCOUNTER — ALLIED HEALTH (OUTPATIENT)
Dept: INTEGRATIVE MEDICINE | Facility: CLINIC | Age: 25
End: 2025-01-07
Payer: COMMERCIAL

## 2025-01-07 DIAGNOSIS — M54.50 CHRONIC BILATERAL LOW BACK PAIN, UNSPECIFIED WHETHER SCIATICA PRESENT: Primary | ICD-10-CM

## 2025-01-07 DIAGNOSIS — G89.29 CHRONIC BILATERAL LOW BACK PAIN, UNSPECIFIED WHETHER SCIATICA PRESENT: Primary | ICD-10-CM

## 2025-01-07 PROCEDURE — 97810 ACUP 1/> WO ESTIM 1ST 15 MIN: CPT | Performed by: ACUPUNCTURIST

## 2025-01-07 PROCEDURE — 97811 ACUP 1/> W/O ESTIM EA ADD 15: CPT | Performed by: ACUPUNCTURIST

## 2025-01-07 NOTE — PROGRESS NOTES
"Acupuncture Visit:     Subjective   Patient ID: Juanjose Key is a 24 y.o. male who presents for No chief complaint on file.   Employee Health  1/20 [employer]    CC: Chronic Low Back Pain    States flare up 2 wks ago  Low back pain feels achy, has pins and needles sensation in legs (no specific area- whole leg)  Legs feel tight  Notes cramping sensation in low back    Imaging XR  spondylosis of the lumbar spine  mild loss of disc space height and facet arthropathy at L4-L5 L5-S1      prev  Patient reports he experienced some sx relief following his last visit.    Initial Visit: 04/11/24  Tx 1/20    Patient is a 23 year old male seeking support for chronic low back pain.  He states he plans on getting an MRI to assess low back more extensively.  He notes back pain has gotten worse with time.      Onset:  4 years ongoing but notes it has been longer than that    Location:  Fixed in the lumbar region mostly but can radiate out into the hips and onto the lower limbs    Duration:  Varied    Frequency:  Daily but notes to varying degrees, sometimes he is unaware of the pain    Characteristics:  Sharp stabbing  Dull achy    Alleviates:  Non weight bearing home     Aggravates:   Hyper extension of back    Related sx:  Not debilitating enough to cause a lot of disruption in daily life but notes he generally tries to anticipate and avoid activities that can cause more pain.    Tx methods:  Pt  Goes to the gym often  Rx muscle relaxers and OTC pain meds    Pain severity:  Worse - 10/10 but rare otherwise worse is 7/10  Normal - 3-4/10  Today - 3-4/10    Feeling (emotions or fatigue):  Tired    Other relevant:  Dx hip dysplasia at birth    Goal for acupuncture treatment:  \"Trying all my options to see if it feels better\"                            Session Information  Is this acupuncture treatment being billed to the patient's insurance company: Yes  This is visit number: 1  The patient has a total number of visits of: " 20  Initial Acupuncture Treatment date: 01/07/25  Name of Insurance Company:  Employee         Review of Systems         Provider reviewed plan for the acupuncture session, precautions and contraindications. Patient/guardian/hospital staff has given consent to treat with full understanding of what to expect during the session. Before acupuncture began, provider explained to the patient to communicate at any time if the procedure was causing discomfort past their tolerance level. Patient agreed to advise acupuncturist. The acupuncturist counseled the patient on the risks of acupuncture treatment including pain, infection, bleeding, and no relief of pain. The patient was positioned comfortably. There was no evidence of infection at the site of needle insertions.    Objective   Physical Exam              Acupuncture Treatment  Body Points - Bilateral: Du 2, 4, UB 23-27, 62; KD 3,7;  GB 40  Other Techniques Utilized: TDP Lamp  TDP Lamp Descripton: low back with PSO  Needle Count In: 20  Needle Count Out: 20  Total Face to Face Time (min): 25 minutes              Assessment/Plan   Diagnoses and all orders for this visit:  Chronic bilateral low back pain, unspecified whether sciatica present